# Patient Record
Sex: MALE | Race: WHITE | NOT HISPANIC OR LATINO | Employment: OTHER | URBAN - METROPOLITAN AREA
[De-identification: names, ages, dates, MRNs, and addresses within clinical notes are randomized per-mention and may not be internally consistent; named-entity substitution may affect disease eponyms.]

---

## 2019-03-21 ENCOUNTER — TELEPHONE (OUTPATIENT)
Dept: SCHEDULING | Facility: IMAGING CENTER | Age: 69
End: 2019-03-21

## 2019-08-26 ENCOUNTER — OFFICE VISIT (OUTPATIENT)
Dept: MEDICAL GROUP | Facility: MEDICAL CENTER | Age: 69
End: 2019-08-26
Payer: MEDICARE

## 2019-08-26 VITALS
HEART RATE: 62 BPM | TEMPERATURE: 98.1 F | OXYGEN SATURATION: 99 % | HEIGHT: 71 IN | SYSTOLIC BLOOD PRESSURE: 126 MMHG | WEIGHT: 174 LBS | BODY MASS INDEX: 24.36 KG/M2 | DIASTOLIC BLOOD PRESSURE: 70 MMHG | RESPIRATION RATE: 14 BRPM

## 2019-08-26 DIAGNOSIS — E78.5 DYSLIPIDEMIA: ICD-10-CM

## 2019-08-26 DIAGNOSIS — G89.29 CHRONIC MIDLINE LOW BACK PAIN WITHOUT SCIATICA: ICD-10-CM

## 2019-08-26 DIAGNOSIS — M67.911 DYSFUNCTION OF ROTATOR CUFF OF BOTH SHOULDERS: ICD-10-CM

## 2019-08-26 DIAGNOSIS — Z12.5 SCREENING FOR PROSTATE CANCER: ICD-10-CM

## 2019-08-26 DIAGNOSIS — I10 ESSENTIAL HYPERTENSION: ICD-10-CM

## 2019-08-26 DIAGNOSIS — M54.50 CHRONIC MIDLINE LOW BACK PAIN WITHOUT SCIATICA: ICD-10-CM

## 2019-08-26 DIAGNOSIS — M67.912 DYSFUNCTION OF ROTATOR CUFF OF BOTH SHOULDERS: ICD-10-CM

## 2019-08-26 PROCEDURE — 99202 OFFICE O/P NEW SF 15 MIN: CPT | Performed by: FAMILY MEDICINE

## 2019-08-26 RX ORDER — LISINOPRIL 10 MG/1
1 TABLET ORAL DAILY
Refills: 0 | COMMUNITY
Start: 2019-07-18

## 2019-08-26 RX ORDER — ATORVASTATIN CALCIUM 10 MG/1
1 TABLET, FILM COATED ORAL DAILY
COMMUNITY
Start: 2018-09-26

## 2019-08-26 RX ORDER — SILDENAFIL 100 MG/1
100 TABLET, FILM COATED ORAL
COMMUNITY
Start: 2019-02-13

## 2019-08-26 SDOH — HEALTH STABILITY: MENTAL HEALTH: HOW MANY STANDARD DRINKS CONTAINING ALCOHOL DO YOU HAVE ON A TYPICAL DAY?: 1 OR 2

## 2019-08-26 ASSESSMENT — PATIENT HEALTH QUESTIONNAIRE - PHQ9: CLINICAL INTERPRETATION OF PHQ2 SCORE: 0

## 2019-08-26 NOTE — ASSESSMENT & PLAN NOTE
See's Dr. Downing at [facility] for primary care and sports med  He is looking to est primary care here as he goes back and forth   Kids and grandkids in [place]       From a shoulder perspective no surgery but has done a lot of PT and has plateaued

## 2019-08-26 NOTE — LETTER
Animated [employer]  Naz Escobar M.D.  [address]  [place] [address]  Fax: [phone]   Authorization for Release/Disclosure of   Protected Health Information   Name: YOLI DAMICO : 1950 SSN: [id number]   Address: [address] [place] [address] [address]  [place] [address] Phone:    [phone] (home)    I authorize the entity listed below to release/disclose the PHI below to:   [facility]/Naz Escobar M.D. and Naz Escobar M.D.   Provider or Entity Name:  [facility]   Address   [facility], [place]            [address], [place] [address] Phone:  [phone]      Fax:       [phone]          Reason for request: continuity of care   Information to be released:    [ X ] LAST COLONOSCOPY,  including any PATH REPORT and follow-up  [ X ] LAST FIT/COLOGUARD RESULT [  ] LAST DEXA  [  ] LAST MAMMOGRAM  [  ] LAST PAP  [  ] LAST LABS [  ] RETINA EXAM REPORT  [  ] IMMUNIZATION RECORDS  [  ] Release all info      [  ] Check here and initial the line next to each item to release ALL health information INCLUDING  _____ Care and treatment for drug and / or alcohol abuse  _____ HIV testing, infection status, or AIDS  _____ Genetic Testing    DATES OF SERVICE OR TIME PERIOD TO BE DISCLOSED: _____________  I understand and acknowledge that:  * This Authorization may be revoked at any time by you in writing, except if your health information has already been used or disclosed.  * Your health information that will be used or disclosed as a result of you signing this authorization could be re-disclosed by the recipient. If this occurs, your re-disclosed health information may no longer be protected by State or Federal laws.  * You may refuse to sign this Authorization. Your refusal will not affect your ability to obtain treatment.  * This Authorization becomes effective upon signing and will  on (date) __________.      If no date is indicated, this Authorization will  one (1) year  from the signature date.    Name: Jose Heladiotoshia    Signature:   Date:     8/26/2019       PLEASE FAX REQUESTED RECORDS BACK TO: [phone]

## 2019-08-26 NOTE — LETTER
[facility]  Naz Escobar M.D.  [address]  [place] [address]  Fax: [phone]   Authorization for Release/Disclosure of   Protected Health Information   Name: YOLI DAMICO : 1950 SSN: [id number]   Address: [address] [place] [address] [address]  [place] [address] Phone:    [phone] (home)    I authorize the entity listed below to release/disclose the PHI below to:   [facility]/Naz Escobar M.D. and Naz Escobar M.D.   Provider or Entity Name:  [facility]   Address   City, State, Zip:               [address], [place] [address]   Phone:  [phone]      Fax:      [phone]        Reason for request: continuity of care   Information to be released:    [ X ] LAST COLONOSCOPY,  including any PATH REPORT and follow-up  [ X ] LAST FIT/COLOGUARD RESULT [  ] LAST DEXA  [  ] LAST MAMMOGRAM  [  ] LAST PAP  [  ] LAST LABS [  ] RETINA EXAM REPORT  [  ] IMMUNIZATION RECORDS  [  ] Release all info      [  ] Check here and initial the line next to each item to release ALL health information INCLUDING  _____ Care and treatment for drug and / or alcohol abuse  _____ HIV testing, infection status, or AIDS  _____ Genetic Testing    DATES OF SERVICE OR TIME PERIOD TO BE DISCLOSED: _____________  I understand and acknowledge that:  * This Authorization may be revoked at any time by you in writing, except if your health information has already been used or disclosed.  * Your health information that will be used or disclosed as a result of you signing this authorization could be re-disclosed by the recipient. If this occurs, your re-disclosed health information may no longer be protected by State or Federal laws.  * You may refuse to sign this Authorization. Your refusal will not affect your ability to obtain treatment.  * This Authorization becomes effective upon signing and will  on (date) __________.      If no date is indicated, this Authorization will  one (1)  year from the signature date.    Name: Jose Heladiotoshia    Signature:   Date:     8/26/2019       PLEASE FAX REQUESTED RECORDS BACK TO: [phone]

## 2019-08-26 NOTE — PROGRESS NOTES
"This medical record contains text that has been entered with the assistance of computer voice recognition and dictation software.  Therefore, it may contain unintended errors in text, spelling, punctuation, or grammar        Chief Complaint   Patient presents with   • Establish Care     needs new pcp        Jose Radha is a 69 y.o. male here evaluation and management of:     Est care  HTN   ED  dysliipdemia     Feels well in his usual state of helath     Current Outpatient Medications   Medication Sig Dispense Refill   • lisinopril (PRINIVIL) 10 MG Tab Take 1 Tab by mouth every day.  0   • atorvastatin (LIPITOR) 10 MG Tab Take 1 Tab by mouth every day.     • sildenafil citrate (VIAGRA) 100 MG tablet Take 100 mg by mouth 1 time daily as needed.       No current facility-administered medications for this visit.      Patient Active Problem List    Diagnosis Date Noted   • Essential hypertension 08/26/2019   • Dysfunction of rotator cuff of both shoulders 08/26/2019   • Dyslipidemia 08/26/2019   • Chronic midline low back pain without sciatica 08/26/2019     No past surgical history on file.   Social History     Tobacco Use   • Smoking status: Former Smoker     Start date: 8/26/1982   • Smokeless tobacco: Never Used   Substance Use Topics   • Alcohol use: Yes     Alcohol/week: 6.0 oz     Types: 5 Glasses of wine, 5 Cans of beer per week     Drinks per session: 1 or 2   • Drug use: Not on file     No family history on file.        ROS  No n/v  all review of system completed and negative except for those listed above     Objective:     /70 (BP Location: Left arm, Patient Position: Sitting, BP Cuff Size: Adult)   Pulse 62   Temp 36.7 °C (98.1 °F) (Temporal)   Resp 14   Ht 1.803 m (5' 11\")   Wt 78.9 kg (174 lb)   SpO2 99%  Body mass index is 24.27 kg/m².  Physical Exam:    Constitutional: Alert, no distress.  Skin: Warm, dry, good turgor, no rashes in visible areas.  Eye: Equal, round and reactive, " conjunctiva clear, lids normal.  ENMT: Lips without lesions, good dentition, oropharynx clear.  Neck: Trachea midline, no masses, no thyromegaly. No cervical or supraclavicular lymphadenopathy.  Respiratory: Unlabored respiratory effort, lungs clear to auscultation, no wheezes, no ronchi.  Cardiovascular: Normal S1, S2, no murmur, no edema.  Abdomen: Soft, non-tender, no masses, no hepatosplenomegaly.  Psych: Alert and oriented x3, normal affect and mood.      Assessment and Plan:   The following treatment plan was discussed        Problem List Items Addressed This Visit     Essential hypertension     Labs and clinic visits q6 mo                  Relevant Medications    lisinopril (PRINIVIL) 10 MG Tab    atorvastatin (LIPITOR) 10 MG Tab    sildenafil citrate (VIAGRA) 100 MG tablet    Other Relevant Orders    Basic Metabolic Panel    Lipid Profile    MICROALBUMIN CREAT RATIO URINE    Lipid Profile    Basic Metabolic Panel    MICROALBUMIN CREAT RATIO URINE    PROSTATE SPECIFIC AG SCREENING    Dysfunction of rotator cuff of both shoulders     See's Dr. Downing at [facility] for primary care and sports med  He is looking to est primary care here as he goes back and forth   Kids and grandkids in [place]       From a shoulder perspective no surgery but has done a lot of PT and has plateaued             Relevant Orders    Basic Metabolic Panel    Lipid Profile    MICROALBUMIN CREAT RATIO URINE    Dyslipidemia     Takes statin            Chronic midline low back pain without sciatica     Chronic back pian   Had stenosis   Fusion 3 levels  No longer a runner now an avid biker               Other Visit Diagnoses     Screening for prostate cancer        Relevant Orders    PROSTATE SPECIFIC AG SCREENING                Instructed to follow up if symptoms worsen or fail to improve, ER/UC precautions discussed as well    Naz Escobar MD  [facility], Family Medicine   [address] [place] [address] [address]   [place] [address]  Phone:  [phone]

## 2019-09-12 ENCOUNTER — HOSPITAL ENCOUNTER (OUTPATIENT)
Dept: LAB | Facility: MEDICAL CENTER | Age: 69
End: 2019-09-12
Attending: FAMILY MEDICINE
Payer: MEDICARE

## 2019-09-12 DIAGNOSIS — M67.912 DYSFUNCTION OF ROTATOR CUFF OF BOTH SHOULDERS: ICD-10-CM

## 2019-09-12 DIAGNOSIS — M67.911 DYSFUNCTION OF ROTATOR CUFF OF BOTH SHOULDERS: ICD-10-CM

## 2019-09-12 DIAGNOSIS — I10 ESSENTIAL HYPERTENSION: ICD-10-CM

## 2019-09-12 LAB
ANION GAP SERPL CALC-SCNC: 7 MMOL/L (ref 0–11.9)
BUN SERPL-MCNC: 12 MG/DL (ref 8–22)
CALCIUM SERPL-MCNC: 9.3 MG/DL (ref 8.5–10.5)
CHLORIDE SERPL-SCNC: 99 MMOL/L (ref 96–112)
CHOLEST SERPL-MCNC: 144 MG/DL (ref 100–199)
CO2 SERPL-SCNC: 28 MMOL/L (ref 20–33)
CREAT SERPL-MCNC: 0.8 MG/DL (ref 0.5–1.4)
CREAT UR-MCNC: 80.6 MG/DL
FASTING STATUS PATIENT QL REPORTED: NORMAL
GLUCOSE SERPL-MCNC: 88 MG/DL (ref 65–99)
HDLC SERPL-MCNC: 37 MG/DL
LDLC SERPL CALC-MCNC: 87 MG/DL
MICROALBUMIN UR-MCNC: <0.7 MG/DL
MICROALBUMIN/CREAT UR: NORMAL MG/G (ref 0–30)
POTASSIUM SERPL-SCNC: 3.8 MMOL/L (ref 3.6–5.5)
SODIUM SERPL-SCNC: 134 MMOL/L (ref 135–145)
TRIGL SERPL-MCNC: 100 MG/DL (ref 0–149)

## 2019-09-12 PROCEDURE — 80048 BASIC METABOLIC PNL TOTAL CA: CPT

## 2019-09-12 PROCEDURE — 36415 COLL VENOUS BLD VENIPUNCTURE: CPT

## 2019-09-12 PROCEDURE — 80061 LIPID PANEL: CPT

## 2019-09-12 PROCEDURE — 82043 UR ALBUMIN QUANTITATIVE: CPT

## 2019-09-12 PROCEDURE — 82570 ASSAY OF URINE CREATININE: CPT

## 2020-08-21 ENCOUNTER — PATIENT MESSAGE (OUTPATIENT)
Dept: MEDICAL GROUP | Facility: MEDICAL CENTER | Age: 70
End: 2020-08-21

## 2020-08-21 DIAGNOSIS — Z12.11 SCREENING FOR COLON CANCER: ICD-10-CM

## 2020-08-21 NOTE — PATIENT COMMUNICATION
1. Caller Name: Jose Garcia       Call Back Number: [phone] (home)       How would the patient prefer to be contacted with a response: [employer] message    2. SPECIFIC Action To Be Taken: Referral pending, please sign.    3. Diagnosis/Clinical Reason for Request: Colonoscopy     4. Specialty & Provider Name/Lab/Imaging Location: [facility]     5. Is appointment scheduled for requested order/referral: no    Patient was informed they will receive a return phone call from the office ONLY if there are any questions before processing their request. Advised to call back if they haven't received a call from the referral department in 5 days.

## 2020-08-25 NOTE — TELEPHONE ENCOUNTER
From: Jose Garcia  To: Naz Escobar M.D.  Sent: 8/21/2020 8:38 AM PDT  Subject: Non-Urgent Medical Question    Dr Escobar,     May I have a referral for my overdue colonoscopy with the same doctor I had 5 years ago? His name is Dr. Herb Bowie with [facility] at [address] [place] [address].    Thank you, Lazarus Garcia

## 2020-09-09 ENCOUNTER — OFFICE VISIT (OUTPATIENT)
Dept: MEDICAL GROUP | Facility: MEDICAL CENTER | Age: 70
End: 2020-09-09
Payer: MEDICARE

## 2020-09-09 VITALS
HEIGHT: 71 IN | SYSTOLIC BLOOD PRESSURE: 118 MMHG | DIASTOLIC BLOOD PRESSURE: 68 MMHG | WEIGHT: 166.34 LBS | HEART RATE: 58 BPM | TEMPERATURE: 97.9 F | RESPIRATION RATE: 16 BRPM | OXYGEN SATURATION: 97 % | BODY MASS INDEX: 23.29 KG/M2

## 2020-09-09 DIAGNOSIS — Z12.11 SCREENING FOR COLON CANCER: ICD-10-CM

## 2020-09-09 DIAGNOSIS — M65.30 TRIGGER FINGER, UNSPECIFIED FINGER, UNSPECIFIED LATERALITY: ICD-10-CM

## 2020-09-09 DIAGNOSIS — Z00.00 ANNUAL PHYSICAL EXAM: ICD-10-CM

## 2020-09-09 DIAGNOSIS — E78.5 DYSLIPIDEMIA: ICD-10-CM

## 2020-09-09 DIAGNOSIS — I10 ESSENTIAL HYPERTENSION: ICD-10-CM

## 2020-09-09 PROCEDURE — 99214 OFFICE O/P EST MOD 30 MIN: CPT | Performed by: FAMILY MEDICINE

## 2020-09-09 SDOH — HEALTH STABILITY: MENTAL HEALTH: HOW OFTEN DO YOU HAVE A DRINK CONTAINING ALCOHOL?: 4 OR MORE TIMES A WEEK

## 2020-09-09 ASSESSMENT — PATIENT HEALTH QUESTIONNAIRE - PHQ9: CLINICAL INTERPRETATION OF PHQ2 SCORE: 0

## 2020-09-09 NOTE — ASSESSMENT & PLAN NOTE
Age appropriate prev health counseling discussed   Ref to colonoscopy placed   Labs ordered for now and in 6 mo

## 2020-09-09 NOTE — ASSESSMENT & PLAN NOTE
Multiple treatment options discussed  Ref to MSK placed   X rays prior   Diagnosis is new and discussed in detail

## 2020-09-09 NOTE — PROGRESS NOTES
This medical record contains text that has been entered with the assistance of computer voice recognition and dictation software.  Therefore, it may contain unintended errors in text, spelling, punctuation, or grammar        Chief Complaint   Patient presents with   • Annual physical exam also discuss finger /hand problem    •             Jose Garcia is a 69 y.o. male here evaluation and management of:     From [place] , knows Dr. Downing   Family moved to [place] a couple years ago full time   HTN   ED  dysliipdemia     Feels well in his usual state of health wants to talk about a new issue   Notices his R hand ring finger triggers   Bothers him because he is avoid golfer   For years   Moderate  Has been offered surgery in the past   Steroid injection last 8 mo approx and has done multiple  He is open to connecting with another hand surgeon in [place] now           Current Outpatient Medications   Medication Sig Dispense Refill   • lisinopril (PRINIVIL) 10 MG Tab Take 1 Tab by mouth every day.  0   • atorvastatin (LIPITOR) 10 MG Tab Take 1 Tab by mouth every day.     • sildenafil citrate (VIAGRA) 100 MG tablet Take 100 mg by mouth 1 time daily as needed.       No current facility-administered medications for this visit.      Patient Active Problem List    Diagnosis Date Noted   • Trigger finger 09/09/2020   • Annual physical exam 09/09/2020   • Essential hypertension 08/26/2019   • Dysfunction of rotator cuff of both shoulders 08/26/2019   • Dyslipidemia 08/26/2019   • Chronic midline low back pain without sciatica 08/26/2019     History reviewed. No pertinent surgical history.   Social History     Tobacco Use   • Smoking status: Former Smoker     Start date: 8/26/1982   • Smokeless tobacco: Never Used   Substance Use Topics   • Alcohol use: Yes     Frequency: 4 or more times a week     Drinks per session: 1 or 2     Comment: 2 drinks a day   • Drug use: Not on file     No family history on file.        ROS  No n/v  all  "review of system completed and negative except for those listed above     Objective:     /68 (BP Location: Right arm, Patient Position: Sitting, BP Cuff Size: Adult)   Pulse (!) 58   Temp 36.6 °C (97.9 °F) (Temporal)   Resp 16   Ht 1.803 m (5' 11\")   Wt 75.4 kg (166 lb 5.4 oz)   SpO2 97%  Body mass index is 23.2 kg/m².  Physical Exam:    Constitutional: Alert, no distress.  Skin: Warm, dry, good turgor, no rashes in visible areas.  Eye: Equal, round and reactive, conjunctiva clear, lids normal.  ENMT: Lips without lesions, good dentition, oropharynx clear.  Neck: Trachea midline, no masses, no thyromegaly. No cervical or supraclavicular lymphadenopathy.  Respiratory: Unlabored respiratory effort, lungs clear to auscultation, no wheezes, no ronchi.  Cardiovascular: Normal S1, S2, no murmur, no edema.  Abdomen: Soft, non-tender, no masses, no hepatosplenomegaly.  Psych: Alert and oriented x3, normal affect and mood.    R hand trigger 4th finger       Assessment and Plan:   The following treatment plan was discussed        Problem List Items Addressed This Visit     Essential hypertension    Relevant Orders    Basic Metabolic Panel    Lipid Profile    MICROALBUMIN CREAT RATIO URINE    Basic Metabolic Panel    Lipid Profile    MICROALBUMIN CREAT RATIO URINE    Dyslipidemia    Trigger finger     Multiple treatment options discussed  Ref to MSK placed   X rays prior   Diagnosis is new and discussed in detail              Relevant Orders    REFERRAL TO SPORTS MEDICINE    DX-HAND 2- RIGHT    REFERRAL TO ORTHOPEDICS    Annual physical exam     Age appropriate prev health counseling discussed   Ref to colonoscopy placed   Labs ordered for now and in 6 mo                  Other Visit Diagnoses     Screening for colon cancer        Relevant Orders    REFERRAL TO GI FOR COLONOSCOPY                Instructed to follow up if symptoms worsen or fail to improve, ER/UC precautions discussed as well    Naz Escobar, " MD  [facility], Family Medicine   [address]   [place] [address]  Phone: [phone]

## 2020-09-11 ENCOUNTER — HOSPITAL ENCOUNTER (OUTPATIENT)
Dept: LAB | Facility: MEDICAL CENTER | Age: 70
End: 2020-09-11
Attending: FAMILY MEDICINE
Payer: MEDICARE

## 2020-09-11 DIAGNOSIS — I10 ESSENTIAL HYPERTENSION: ICD-10-CM

## 2020-09-11 LAB
ANION GAP SERPL CALC-SCNC: 13 MMOL/L (ref 7–16)
BUN SERPL-MCNC: 10 MG/DL (ref 8–22)
CALCIUM SERPL-MCNC: 9.2 MG/DL (ref 8.5–10.5)
CHLORIDE SERPL-SCNC: 98 MMOL/L (ref 96–112)
CHOLEST SERPL-MCNC: 158 MG/DL (ref 100–199)
CO2 SERPL-SCNC: 26 MMOL/L (ref 20–33)
CREAT SERPL-MCNC: 0.68 MG/DL (ref 0.5–1.4)
CREAT UR-MCNC: 90.42 MG/DL
FASTING STATUS PATIENT QL REPORTED: NORMAL
GLUCOSE SERPL-MCNC: 104 MG/DL (ref 65–99)
HDLC SERPL-MCNC: 42 MG/DL
LDLC SERPL CALC-MCNC: 100 MG/DL
MICROALBUMIN UR-MCNC: <1.2 MG/DL
MICROALBUMIN/CREAT UR: NORMAL MG/G (ref 0–30)
POTASSIUM SERPL-SCNC: 4.3 MMOL/L (ref 3.6–5.5)
SODIUM SERPL-SCNC: 137 MMOL/L (ref 135–145)
TRIGL SERPL-MCNC: 81 MG/DL (ref 0–149)

## 2020-09-11 PROCEDURE — 82570 ASSAY OF URINE CREATININE: CPT

## 2020-09-11 PROCEDURE — 36415 COLL VENOUS BLD VENIPUNCTURE: CPT

## 2020-09-11 PROCEDURE — 80048 BASIC METABOLIC PNL TOTAL CA: CPT

## 2020-09-11 PROCEDURE — 82043 UR ALBUMIN QUANTITATIVE: CPT

## 2020-09-11 PROCEDURE — 80061 LIPID PANEL: CPT

## 2021-01-15 DIAGNOSIS — Z23 NEED FOR VACCINATION: ICD-10-CM
